# Patient Record
Sex: MALE | Employment: UNEMPLOYED | URBAN - METROPOLITAN AREA
[De-identification: names, ages, dates, MRNs, and addresses within clinical notes are randomized per-mention and may not be internally consistent; named-entity substitution may affect disease eponyms.]

---

## 2023-12-23 ENCOUNTER — HOSPITAL ENCOUNTER (EMERGENCY)
Age: 28
Discharge: HOME | End: 2023-12-23

## 2023-12-23 VITALS
OXYGEN SATURATION: 99 % | RESPIRATION RATE: 16 BRPM | HEIGHT: 75 IN | BODY MASS INDEX: 24.87 KG/M2 | HEART RATE: 104 BPM | TEMPERATURE: 100.4 F | WEIGHT: 200 LBS | DIASTOLIC BLOOD PRESSURE: 69 MMHG | SYSTOLIC BLOOD PRESSURE: 129 MMHG

## 2023-12-23 DIAGNOSIS — R68.89 FLU-LIKE SYMPTOMS: Primary | ICD-10-CM

## 2023-12-23 PROCEDURE — 99284 EMERGENCY DEPT VISIT MOD MDM: CPT

## 2023-12-23 PROCEDURE — 96372 THER/PROPH/DIAG INJ SC/IM: CPT

## 2023-12-23 PROCEDURE — 6370000000 HC RX 637 (ALT 250 FOR IP): Performed by: PHYSICIAN ASSISTANT

## 2023-12-23 PROCEDURE — 6360000002 HC RX W HCPCS: Performed by: PHYSICIAN ASSISTANT

## 2023-12-23 RX ORDER — ACETAMINOPHEN 500 MG
1000 TABLET ORAL ONCE
Status: COMPLETED | OUTPATIENT
Start: 2023-12-23 | End: 2023-12-23

## 2023-12-23 RX ORDER — KETOROLAC TROMETHAMINE 15 MG/ML
30 INJECTION, SOLUTION INTRAMUSCULAR; INTRAVENOUS ONCE
Status: COMPLETED | OUTPATIENT
Start: 2023-12-23 | End: 2023-12-23

## 2023-12-23 RX ADMIN — KETOROLAC TROMETHAMINE 30 MG: 15 INJECTION, SOLUTION INTRAMUSCULAR; INTRAVENOUS at 12:49

## 2023-12-23 RX ADMIN — ACETAMINOPHEN 1000 MG: 500 TABLET ORAL at 12:49

## 2023-12-23 NOTE — DISCHARGE INSTRUCTIONS
Please call a primary care provider to schedule an appointment for reevaluation within 7 to 10 days and to discuss your visit to the emergency department. If you do not have a primary care provider, you can contact the primary care referral line, or the provider listed in your after visit summary paperwork provided to you today. Return to emergency department if you develop any difficulty breathing, difficulty swallowing;  neck swelling or painful large lumps in your neck, or if sore throat worsens and does not improve or worsens after 3 days; any uncontrollable vomiting or if you are unable  to keep fluids down. Return with any  weakness, chest pain, confusion, passing out, worsening symptoms or any new concerns. To help with your symptoms, you can use the following: For congestion, you could use saline nasal sprays, irrigation or rinses. Adults may find relief from over-the-counter decongestants. For sore throat you could use over-the-counter pain reliever such as iibuprofen and/or acetaminophen (Tylenol). *  Gargle with warm salt water, or use any over-the-counter throat sprays. For fevers, chills, muscle aches, or any headaches, you can use over-the-counter pain relievers such as ibuprofen and/or acetaminophen (Tylenol). *    Tylenol (acetaminophen) : 650 mg-1000 mg every 6 hours. Ibuprofen 400-600 mg every 6 hours. For cough, you could use vaporizers, humidifiers, warm baths/showers. Stay hydrated, get plenty of rest, eat small frequent nutritious meals and snacks. Hot liquids (tea, broth, soup) if age-appropriate may also help with your symptoms. Practice good hand hygiene; avoid touching your mouth nose and eyes. Wear a mask.     *(Over-the-counter pain medication such as ibuprofen or Tylenol are typically safe to use, however talk with your doctor to make sure these are safe for you.)